# Patient Record
Sex: FEMALE | Race: WHITE | NOT HISPANIC OR LATINO
[De-identification: names, ages, dates, MRNs, and addresses within clinical notes are randomized per-mention and may not be internally consistent; named-entity substitution may affect disease eponyms.]

---

## 2017-01-19 ENCOUNTER — APPOINTMENT (OUTPATIENT)
Dept: HEART AND VASCULAR | Facility: CLINIC | Age: 77
End: 2017-01-19

## 2017-01-19 VITALS
SYSTOLIC BLOOD PRESSURE: 120 MMHG | WEIGHT: 147 LBS | DIASTOLIC BLOOD PRESSURE: 82 MMHG | RESPIRATION RATE: 18 BRPM | BODY MASS INDEX: 26.05 KG/M2 | HEIGHT: 63 IN | HEART RATE: 76 BPM

## 2017-01-19 RX ORDER — RASAGILINE MESYLATE 1 MG/1
1 TABLET ORAL
Refills: 0 | Status: ACTIVE | COMMUNITY

## 2017-02-02 ENCOUNTER — APPOINTMENT (OUTPATIENT)
Dept: HEART AND VASCULAR | Facility: CLINIC | Age: 77
End: 2017-02-02

## 2017-02-02 VITALS
SYSTOLIC BLOOD PRESSURE: 162 MMHG | HEIGHT: 63 IN | HEART RATE: 67 BPM | BODY MASS INDEX: 25.69 KG/M2 | WEIGHT: 145 LBS | DIASTOLIC BLOOD PRESSURE: 80 MMHG

## 2017-02-06 ENCOUNTER — RESULT REVIEW (OUTPATIENT)
Age: 77
End: 2017-02-06

## 2017-02-14 ENCOUNTER — APPOINTMENT (OUTPATIENT)
Dept: HEART AND VASCULAR | Facility: CLINIC | Age: 77
End: 2017-02-14

## 2017-02-14 ENCOUNTER — NON-APPOINTMENT (OUTPATIENT)
Age: 77
End: 2017-02-14

## 2017-02-14 VITALS
SYSTOLIC BLOOD PRESSURE: 160 MMHG | DIASTOLIC BLOOD PRESSURE: 84 MMHG | HEIGHT: 63 IN | BODY MASS INDEX: 25.87 KG/M2 | HEART RATE: 88 BPM | RESPIRATION RATE: 20 BRPM | WEIGHT: 146 LBS

## 2017-02-16 LAB
ANION GAP SERPL CALC-SCNC: 16 MMOL/L
BUN SERPL-MCNC: 13 MG/DL
CALCIUM SERPL-MCNC: 9.2 MG/DL
CHLORIDE SERPL-SCNC: 99 MMOL/L
CO2 SERPL-SCNC: 24 MMOL/L
CREAT SERPL-MCNC: 0.74 MG/DL
GLUCOSE SERPL-MCNC: 106 MG/DL
POTASSIUM SERPL-SCNC: 4.9 MMOL/L
SODIUM SERPL-SCNC: 139 MMOL/L

## 2017-02-23 ENCOUNTER — APPOINTMENT (OUTPATIENT)
Dept: HEART AND VASCULAR | Facility: CLINIC | Age: 77
End: 2017-02-23

## 2017-02-23 ENCOUNTER — LABORATORY RESULT (OUTPATIENT)
Age: 77
End: 2017-02-23

## 2017-02-23 VITALS
DIASTOLIC BLOOD PRESSURE: 70 MMHG | WEIGHT: 145 LBS | BODY MASS INDEX: 25.69 KG/M2 | SYSTOLIC BLOOD PRESSURE: 122 MMHG | HEART RATE: 72 BPM | HEIGHT: 63 IN | RESPIRATION RATE: 24 BRPM

## 2017-02-23 DIAGNOSIS — Z00.00 ENCOUNTER FOR GENERAL ADULT MEDICAL EXAMINATION W/OUT ABNORMAL FINDINGS: ICD-10-CM

## 2017-02-24 LAB
ALBUMIN SERPL ELPH-MCNC: 4.1 G/DL
ALP BLD-CCNC: 77 U/L
ALT SERPL-CCNC: 17 U/L
ANION GAP SERPL CALC-SCNC: 15 MMOL/L
APPEARANCE: CLEAR
AST SERPL-CCNC: 24 U/L
BASOPHILS # BLD AUTO: 0.04 K/UL
BASOPHILS NFR BLD AUTO: 1 %
BILIRUB SERPL-MCNC: 0.4 MG/DL
BILIRUBIN URINE: NEGATIVE
BLOOD URINE: NEGATIVE
BUN SERPL-MCNC: 12 MG/DL
CALCIUM SERPL-MCNC: 9.1 MG/DL
CHLORIDE SERPL-SCNC: 101 MMOL/L
CHOLEST SERPL-MCNC: 192 MG/DL
CHOLEST/HDLC SERPL: 3 RATIO
CO2 SERPL-SCNC: 22 MMOL/L
COLOR: YELLOW
CREAT SERPL-MCNC: 0.84 MG/DL
EOSINOPHIL # BLD AUTO: 0.06 K/UL
EOSINOPHIL NFR BLD AUTO: 1.6 %
GLUCOSE QUALITATIVE U: NORMAL MG/DL
GLUCOSE SERPL-MCNC: 90 MG/DL
HCT VFR BLD CALC: 40.1 %
HDLC SERPL-MCNC: 65 MG/DL
HGB BLD-MCNC: 12.7 G/DL
IMM GRANULOCYTES NFR BLD AUTO: 0.5 %
INR PPP: 1.03 RATIO
KETONES URINE: NEGATIVE
LDLC SERPL CALC-MCNC: 84 MG/DL
LEUKOCYTE ESTERASE URINE: ABNORMAL
LYMPHOCYTES # BLD AUTO: 1.18 K/UL
LYMPHOCYTES NFR BLD AUTO: 30.5 %
MAN DIFF?: NORMAL
MCHC RBC-ENTMCNC: 29.6 PG
MCHC RBC-ENTMCNC: 31.7 GM/DL
MCV RBC AUTO: 93.5 FL
MONOCYTES # BLD AUTO: 0.43 K/UL
MONOCYTES NFR BLD AUTO: 11.1 %
NEUTROPHILS # BLD AUTO: 2.14 K/UL
NEUTROPHILS NFR BLD AUTO: 55.3 %
NITRITE URINE: NEGATIVE
PH URINE: 6
PLATELET # BLD AUTO: 297 K/UL
POTASSIUM SERPL-SCNC: 4.6 MMOL/L
PROT SERPL-MCNC: 7.5 G/DL
PROTEIN URINE: NEGATIVE MG/DL
PT BLD: 11.6 SEC
RBC # BLD: 4.29 M/UL
RBC # FLD: 13.7 %
SODIUM SERPL-SCNC: 138 MMOL/L
SPECIFIC GRAVITY URINE: 1.02
TRIGL SERPL-MCNC: 214 MG/DL
UROBILINOGEN URINE: NORMAL MG/DL
WBC # FLD AUTO: 3.87 K/UL

## 2017-02-27 RX ORDER — CHLORHEXIDINE GLUCONATE 213 G/1000ML
1 SOLUTION TOPICAL ONCE
Qty: 0 | Refills: 0 | Status: DISCONTINUED | OUTPATIENT
Start: 2017-02-28 | End: 2017-02-28

## 2017-02-27 NOTE — H&P ADULT - NSHPLABSRESULTS_GEN_ALL_CORE
13.1   4.3   )-----------( 237      ( 28 Feb 2017 07:48 )             40.0     28 Feb 2017 07:48    138    |  103    |  13     ----------------------------<  94     4.3     |  27     |  0.74     Ca    8.8        28 Feb 2017 07:48    TPro  8.2    /  Alb  3.9    /  TBili  0.8    /  DBili  x      /  AST  21     /  ALT  9      /  AlkPhos  83     28 Feb 2017 07:48      PT/INR - ( 28 Feb 2017 07:48 )   PT: 11.5 sec;   INR: 1.04          PTT - ( 28 Feb 2017 07:48 )  PTT:30.6 sec 13.1   4.3   )-----------( 237      ( 28 Feb 2017 07:48 )             40.0     28 Feb 2017 07:48    138    |  103    |  13     ----------------------------<  94     4.3     |  27     |  0.74     Ca    8.8        28 Feb 2017 07:48    TPro  8.2    /  Alb  3.9    /  TBili  0.8    /  DBili  x      /  AST  21     /  ALT  9      /  AlkPhos  83     28 Feb 2017 07:48      PT/INR - ( 28 Feb 2017 07:48 )   PT: 11.5 sec;   INR: 1.04          PTT - ( 28 Feb 2017 07:48 )  PTT:30.6 sec    EKG: NSR @ 68bpm with no ischemic changes

## 2017-02-27 NOTE — H&P ADULT - HISTORY OF PRESENT ILLNESS
75 y/o F PMHx HTN, HLD, Hypothyroidism, Parkinson's Disease, who initially presented to her Cardiologist with c/o increase in palpitations and weakness with a "pulling" chest pain over the past couple months with associated GRIFFIN of 2 flight of stairs.  Symptoms are relieved with rest.  Denies any SOB at rest, PND, orthopnea, nausea, vomiting, diarrhea, LE edema.  Pt subsequently underwent a Stress Echo   Pt also had a CTA Coronaries on 2/17/17 revealing elevated Calcium score of 892, non-obstructive plaque in LM, probable moderate stenosis secondary to mixed calcific and non-calcific plaque in prox and mid LAD, boderline moderate stenosis in prox LCx, OM1 incompletely visualized but normal proximally, non-obstructive plaque in prox and mid RCA, enlarged descending thoracic aorta measuring approximately 3.8cm.       In light of patient's recent abnormal imaging, risk factors and CCS Class 3 Anginal Equivalent Symptoms, she now is referred for recommended Cardiac Catheterization with intervention if clinically indicated. 75 y/o F PMHx HTN, HLD, Hypothyroidism, Parkinson's Disease, who initially presented to her Cardiologist with c/o increase in palpitations and weakness with a "pulling" chest pain over the past couple months with associated GRIFFIN of 2 flight of stairs.  Symptoms are relieved with rest.  Denies any SOB at rest, PND, orthopnea, nausea, vomiting, diarrhea, LE edema.  Pt subsequently underwent a Exercise Stress Echo on 2/2/17 revealed exercise capacity to stage 3 Valentin Protocol to 5 METS terminated 2/2 fatigue, normal hemodynamic response, non-diagnostic ECG response with appropriate HR and BP response, mild segmental LV systolic dysfunction(EF 55-60% baseline), new WMA in lateral segments c/w ischemia. Pt also had a CTA Coronaries on 2/17/17 revealing elevated Calcium score of 892, non-obstructive plaque in LM, probable moderate stenosis secondary to mixed calcific and non-calcific plaque in prox and mid LAD, boderline moderate stenosis in prox LCx, OM1 incompletely visualized but normal proximally, non-obstructive plaque in prox and mid RCA, enlarged descending thoracic aorta measuring approximately 3.8cm.     In light of patient's recent abnormal imaging, risk factors and CCS Class 3 Anginal Equivalent Symptoms, she now is referred for recommended Cardiac Catheterization with intervention if clinically indicated.

## 2017-02-27 NOTE — H&P ADULT - ASSESSMENT
75 y/o F PMHx HTN, HLD, Hypothyroidism, Parkinson's Disease, who presents for recommended cardiac catheterization with possible intervention in the setting of class III anginal symptoms and abnormal NST and CTA coronaries.    ASA 3, Mallampati 2 75 y/o F PMHx HTN, HLD, Hypothyroidism, Parkinson's Disease, who presents for recommended cardiac catheterization with possible intervention in the setting of class III anginal symptoms and abnormal NST and CTA coronaries.    ASA 3, Mallampati 2    Risks & benefits of procedure and alternative therapy have been explained to the patient including but not limited to: allergic reaction, bleeding w/possible need for blood transfusion, infection, renal and vascular compromise, limb damage, arrhythmia, stroke, vessel dissection/perforation, Myocardial infarction, emergent CABG. Informed consent obtained and in chart.

## 2017-02-27 NOTE — H&P ADULT - NSHPPHYSICALEXAM_GEN_ALL_CORE
Appearance: Normal	  HEENT:   Normal oral mucosa, PERRL, EOMI	  Neck: Supple, - JVD; No Carotid Bruit   Cardiovascular: Normal S1 S2, No JVD, No murmurs  Respiratory: Lungs clear to auscultation/Decreased Breath Sounds/No Rales, Rhonchi, Wheezing	  Gastrointestinal:  Soft, Non-tender, + BS	  Skin: No rashes, No ecchymoses, No cyanosis  Extremities: Normal range of motion, No clubbing, cyanosis or edema  Vascular: Peripheral pulses palpable 2+ bilaterally  Neurologic: Non-focal  Psychiatry: A & O x 3, Mood & affect appropriate Appearance: Normal	  HEENT:   Normal oral mucosa, PERRL, EOMI	  Neck: Supple, - JVD; No Carotid Bruit   Cardiovascular: Normal S1 S2, No JVD, No murmurs  Respiratory: Lungs clear to auscultation, No Rales, Rhonchi, Wheezing	  Gastrointestinal:  Soft, Non-tender, + BS	  Skin: No rashes, No ecchymoses, No cyanosis  Extremities: Normal range of motion, No clubbing, cyanosis or edema  Vascular: Peripheral pulses palpable 2+ bilaterally, femoral pulses 2+ b/l without bruit, DP/PT 1+ b/l  Neurologic: Non-focal  Psychiatry: A & O x 3, Mood & affect appropriate

## 2017-02-28 ENCOUNTER — OUTPATIENT (OUTPATIENT)
Dept: OUTPATIENT SERVICES | Facility: HOSPITAL | Age: 77
LOS: 1 days | Discharge: NURSING, REHAB OR LONG-TERM CARE | End: 2017-02-28
Payer: MEDICARE

## 2017-02-28 LAB
ALBUMIN SERPL ELPH-MCNC: 3.9 G/DL — SIGNIFICANT CHANGE UP (ref 3.4–5)
ALP SERPL-CCNC: 83 U/L — SIGNIFICANT CHANGE UP (ref 40–120)
ALT FLD-CCNC: 9 U/L — LOW (ref 12–42)
ANION GAP SERPL CALC-SCNC: 8 MMOL/L — LOW (ref 9–16)
APTT BLD: 30.6 SEC — SIGNIFICANT CHANGE UP (ref 27.5–37.4)
AST SERPL-CCNC: 21 U/L — SIGNIFICANT CHANGE UP (ref 15–37)
BASOPHILS NFR BLD AUTO: 0.5 % — SIGNIFICANT CHANGE UP (ref 0–2)
BILIRUB SERPL-MCNC: 0.8 MG/DL — SIGNIFICANT CHANGE UP (ref 0.2–1.2)
BUN SERPL-MCNC: 13 MG/DL — SIGNIFICANT CHANGE UP (ref 7–23)
CALCIUM SERPL-MCNC: 8.8 MG/DL — SIGNIFICANT CHANGE UP (ref 8.5–10.5)
CHLORIDE SERPL-SCNC: 103 MMOL/L — SIGNIFICANT CHANGE UP (ref 96–108)
CHOLEST SERPL-MCNC: 190 MG/DL — SIGNIFICANT CHANGE UP
CK MB CFR SERPL CALC: <1 NG/ML — SIGNIFICANT CHANGE UP (ref 0.5–3.6)
CK SERPL-CCNC: 54 U/L — SIGNIFICANT CHANGE UP (ref 26–192)
CO2 SERPL-SCNC: 27 MMOL/L — SIGNIFICANT CHANGE UP (ref 22–31)
CREAT SERPL-MCNC: 0.74 MG/DL — SIGNIFICANT CHANGE UP (ref 0.5–1.3)
CRP SERPL-MCNC: <0.29 MG/DL — SIGNIFICANT CHANGE UP
EOSINOPHIL NFR BLD AUTO: 1.6 % — SIGNIFICANT CHANGE UP (ref 0–6)
GLUCOSE SERPL-MCNC: 94 MG/DL — SIGNIFICANT CHANGE UP (ref 70–99)
HBA1C BLD-MCNC: 5.6 % — SIGNIFICANT CHANGE UP (ref 4.8–5.6)
HCT VFR BLD CALC: 40 % — SIGNIFICANT CHANGE UP (ref 34.5–45)
HDLC SERPL-MCNC: 72 MG/DL — SIGNIFICANT CHANGE UP
HGB BLD-MCNC: 13.1 G/DL — SIGNIFICANT CHANGE UP (ref 11.5–15.5)
INR BLD: 1.04 — SIGNIFICANT CHANGE UP (ref 0.88–1.16)
LIPID PNL WITH DIRECT LDL SERPL: 98 MG/DL — SIGNIFICANT CHANGE UP
LYMPHOCYTES # BLD AUTO: 22.1 % — SIGNIFICANT CHANGE UP (ref 13–44)
MCHC RBC-ENTMCNC: 29.9 PG — SIGNIFICANT CHANGE UP (ref 27–34)
MCHC RBC-ENTMCNC: 32.8 G/DL — SIGNIFICANT CHANGE UP (ref 32–36)
MCV RBC AUTO: 91.3 FL — SIGNIFICANT CHANGE UP (ref 80–100)
MONOCYTES NFR BLD AUTO: 7.6 % — SIGNIFICANT CHANGE UP (ref 2–14)
NEUTROPHILS NFR BLD AUTO: 68.2 % — SIGNIFICANT CHANGE UP (ref 43–77)
PLATELET # BLD AUTO: 237 K/UL — SIGNIFICANT CHANGE UP (ref 150–400)
POTASSIUM SERPL-MCNC: 4.3 MMOL/L — SIGNIFICANT CHANGE UP (ref 3.5–5.3)
POTASSIUM SERPL-SCNC: 4.3 MMOL/L — SIGNIFICANT CHANGE UP (ref 3.5–5.3)
PROT SERPL-MCNC: 8.2 G/DL — SIGNIFICANT CHANGE UP (ref 6.4–8.2)
PROTHROM AB SERPL-ACNC: 11.5 SEC — SIGNIFICANT CHANGE UP (ref 10–13.1)
RBC # BLD: 4.38 M/UL — SIGNIFICANT CHANGE UP (ref 3.8–5.2)
RBC # FLD: 13 % — SIGNIFICANT CHANGE UP (ref 10.3–16.9)
SODIUM SERPL-SCNC: 138 MMOL/L — SIGNIFICANT CHANGE UP (ref 135–145)
TOTAL CHOLESTEROL/HDL RATIO MEASUREMENT: 2.6 RATIO — SIGNIFICANT CHANGE UP
TRIGL SERPL-MCNC: 102 MG/DL — SIGNIFICANT CHANGE UP
WBC # BLD: 4.3 K/UL — SIGNIFICANT CHANGE UP (ref 3.8–10.5)
WBC # FLD AUTO: 4.3 K/UL — SIGNIFICANT CHANGE UP (ref 3.8–10.5)

## 2017-02-28 PROCEDURE — C1769: CPT

## 2017-02-28 PROCEDURE — 93010 ELECTROCARDIOGRAM REPORT: CPT

## 2017-02-28 PROCEDURE — C1887: CPT

## 2017-02-28 PROCEDURE — C1889: CPT

## 2017-02-28 PROCEDURE — C1760: CPT

## 2017-02-28 PROCEDURE — 83036 HEMOGLOBIN GLYCOSYLATED A1C: CPT

## 2017-02-28 PROCEDURE — 82550 ASSAY OF CK (CPK): CPT

## 2017-02-28 PROCEDURE — 93458 L HRT ARTERY/VENTRICLE ANGIO: CPT

## 2017-02-28 PROCEDURE — 93458 L HRT ARTERY/VENTRICLE ANGIO: CPT | Mod: 26

## 2017-02-28 PROCEDURE — 85025 COMPLETE CBC W/AUTO DIFF WBC: CPT

## 2017-02-28 PROCEDURE — 93005 ELECTROCARDIOGRAM TRACING: CPT

## 2017-02-28 PROCEDURE — C1894: CPT

## 2017-02-28 PROCEDURE — 82553 CREATINE MB FRACTION: CPT

## 2017-02-28 PROCEDURE — 86140 C-REACTIVE PROTEIN: CPT

## 2017-02-28 PROCEDURE — 80053 COMPREHEN METABOLIC PANEL: CPT

## 2017-02-28 PROCEDURE — 85610 PROTHROMBIN TIME: CPT

## 2017-02-28 PROCEDURE — 85730 THROMBOPLASTIN TIME PARTIAL: CPT

## 2017-02-28 PROCEDURE — 80061 LIPID PANEL: CPT

## 2017-02-28 RX ORDER — CLOPIDOGREL BISULFATE 75 MG/1
600 TABLET, FILM COATED ORAL ONCE
Qty: 0 | Refills: 0 | Status: COMPLETED | OUTPATIENT
Start: 2017-02-28 | End: 2017-02-28

## 2017-02-28 RX ORDER — LOSARTAN POTASSIUM 100 MG/1
1 TABLET, FILM COATED ORAL
Qty: 0 | Refills: 0 | COMMUNITY

## 2017-02-28 RX ORDER — CHOLECALCIFEROL (VITAMIN D3) 125 MCG
1 CAPSULE ORAL
Qty: 0 | Refills: 0 | COMMUNITY

## 2017-02-28 RX ORDER — METOPROLOL TARTRATE 50 MG
1 TABLET ORAL
Qty: 0 | Refills: 0 | COMMUNITY

## 2017-02-28 RX ORDER — RASAGILINE 0.5 MG/1
1 TABLET ORAL
Qty: 0 | Refills: 0 | COMMUNITY

## 2017-02-28 RX ORDER — SODIUM CHLORIDE 9 MG/ML
1000 INJECTION INTRAMUSCULAR; INTRAVENOUS; SUBCUTANEOUS
Qty: 0 | Refills: 0 | Status: DISCONTINUED | OUTPATIENT
Start: 2017-02-28 | End: 2017-02-28

## 2017-02-28 RX ORDER — SIMVASTATIN 20 MG/1
1 TABLET, FILM COATED ORAL
Qty: 0 | Refills: 0 | COMMUNITY

## 2017-02-28 RX ORDER — RANOLAZINE 500 MG/1
1 TABLET, FILM COATED, EXTENDED RELEASE ORAL
Qty: 0 | Refills: 0 | COMMUNITY

## 2017-02-28 RX ORDER — CARBIDOPA AND LEVODOPA 25; 100 MG/1; MG/1
0.5 TABLET ORAL
Qty: 0 | Refills: 0 | COMMUNITY

## 2017-02-28 RX ORDER — LEVOTHYROXINE SODIUM 125 MCG
1 TABLET ORAL
Qty: 0 | Refills: 0 | COMMUNITY

## 2017-02-28 RX ORDER — ASPIRIN/CALCIUM CARB/MAGNESIUM 324 MG
325 TABLET ORAL ONCE
Qty: 0 | Refills: 0 | Status: COMPLETED | OUTPATIENT
Start: 2017-02-28 | End: 2017-02-28

## 2017-02-28 RX ADMIN — CLOPIDOGREL BISULFATE 600 MILLIGRAM(S): 75 TABLET, FILM COATED ORAL at 08:44

## 2017-02-28 RX ADMIN — SODIUM CHLORIDE 75 MILLILITER(S): 9 INJECTION INTRAMUSCULAR; INTRAVENOUS; SUBCUTANEOUS at 10:11

## 2017-02-28 RX ADMIN — Medication 325 MILLIGRAM(S): at 08:43

## 2017-02-28 NOTE — PROGRESS NOTE ADULT - SUBJECTIVE AND OBJECTIVE BOX
Interventional Cardiology PA SDA Discharge Note    Patient without complaints. Ambulated and voided without difficulties    Afebrile, VSS    Ext:    		Right/Left            R Groin:    no   hematoma,   no bruit, dressing; C/D/I  		Right/Left              Radial :    hematoma,     bleeding, dressing; C/D/I      Pulses:    intact RAD/DP/PT?to baseline     A/P:77 y/o F PMHx HTN, HLD, Hypothyroidism, Parkinson's Disease, who initially presented to her Cardiologist with c/o increase in palpitations and weakness with a "pulling" chest pain over the past couple months with associated GRIFFIN of 2 flight of stairs.  Symptoms are relieved with rest.  Denies any SOB at rest, PND, orthopnea, nausea, vomiting, diarrhea, LE edema.  Pt subsequently underwent a Exercise Stress Echo on 2/2/17 revealed exercise capacity to stage 3 Valentin Protocol to 5 METS terminated 2/2 fatigue, normal hemodynamic response, non-diagnostic ECG response with appropriate HR and BP response, mild segmental LV systolic dysfunction(EF 55-60% baseline), new WMA in lateral segments c/w ischemia. Pt also had a CTA Coronaries on 2/17/17 revealing elevated Calcium score of 892, non-obstructive plaque in LM, probable moderate stenosis secondary to mixed calcific and non-calcific plaque in prox and mid LAD, boderline moderate stenosis in prox LCx, OM1 incompletely visualized but normal proximally, non-obstructive plaque in prox and mid RCA, enlarged descending thoracic aorta measuring approximately 3.8cm.     In light of patient's recent abnormal imaging, risk factors and CCS Class 3 Anginal Equivalent Symptoms, she now is referred for recommended Cardiac Catheterization with intervention if clinically indicated.     Pt s/p cath 2/28 revealing nonobstructive CAD, EF 55%, LVEDP 3. R PC. Pt deemed stable for D/C home and instructed to f/u with Dr. Devante Fuentes.      1.	Stable for discharge as per attending Dr. Anderson after bed rest, pt voids, groin/wrist stable and 30 minutes of ambulation.  2.	Follow-up with PMD/Cardiologsapna Fuentes in 1-2 weeks  3.	Discharged forms signed and copies in chart Interventional Cardiology PA SDA Discharge Note    Patient without complaints. Ambulated and voided without difficulties    Afebrile, VSS    Ext:    		Right/Left            R Groin:    no   hematoma,   no bruit, dressing; C/D/I  		Right/Left              Radial :    hematoma,     bleeding, dressing; C/D/I      Pulses:    intact DP/PT to baseline     A/P:75 y/o F PMHx HTN, HLD, Hypothyroidism, Parkinson's Disease, who initially presented to her Cardiologist with c/o increase in palpitations and weakness with a "pulling" chest pain over the past couple months with associated GRIFFIN of 2 flight of stairs.  Symptoms are relieved with rest.  Denies any SOB at rest, PND, orthopnea, nausea, vomiting, diarrhea, LE edema.  Pt subsequently underwent a Exercise Stress Echo on 2/2/17 revealed exercise capacity to stage 3 Valentin Protocol to 5 METS terminated 2/2 fatigue, normal hemodynamic response, non-diagnostic ECG response with appropriate HR and BP response, mild segmental LV systolic dysfunction(EF 55-60% baseline), new WMA in lateral segments c/w ischemia. Pt also had a CTA Coronaries on 2/17/17 revealing elevated Calcium score of 892, non-obstructive plaque in LM, probable moderate stenosis secondary to mixed calcific and non-calcific plaque in prox and mid LAD, boderline moderate stenosis in prox LCx, OM1 incompletely visualized but normal proximally, non-obstructive plaque in prox and mid RCA, enlarged descending thoracic aorta measuring approximately 3.8cm.     In light of patient's recent abnormal imaging, risk factors and CCS Class 3 Anginal Equivalent Symptoms, she now is referred for recommended Cardiac Catheterization with intervention if clinically indicated.     Pt s/p cath 2/28 revealing nonobstructive CAD, EF 55%, LVEDP 3. R PC. Pt deemed stable for D/C home and instructed to f/u with Dr. Devante Fuentes.      1.	Stable for discharge as per attending Dr. Anderson after bed rest, pt voids, groin/wrist stable and 30 minutes of ambulation.  2.	Follow-up with PMD/Cardiologsapna Fuentes in 1-2 weeks  3.	Discharged forms signed and copies in chart

## 2017-02-28 NOTE — PROGRESS NOTE ADULT - SUBJECTIVE AND OBJECTIVE BOX
Procedure: LHC, Perclose  Indication: CAD, +EST  Complication: none    Result:  1) Non-obstructive CAD  2) Nl LVF EF 55%, LVEDP 3    Plan: D/C home. To f/u with Dr. Devante Fuentes.

## 2017-03-07 DIAGNOSIS — Z82.49 FAMILY HISTORY OF ISCHEMIC HEART DISEASE AND OTHER DISEASES OF THE CIRCULATORY SYSTEM: ICD-10-CM

## 2017-03-07 DIAGNOSIS — E78.5 HYPERLIPIDEMIA, UNSPECIFIED: ICD-10-CM

## 2017-03-07 DIAGNOSIS — I10 ESSENTIAL (PRIMARY) HYPERTENSION: ICD-10-CM

## 2017-03-07 DIAGNOSIS — I25.110 ATHEROSCLEROTIC HEART DISEASE OF NATIVE CORONARY ARTERY WITH UNSTABLE ANGINA PECTORIS: ICD-10-CM

## 2017-03-10 ENCOUNTER — RESULT REVIEW (OUTPATIENT)
Age: 77
End: 2017-03-10

## 2017-03-22 ENCOUNTER — APPOINTMENT (OUTPATIENT)
Dept: HEART AND VASCULAR | Facility: CLINIC | Age: 77
End: 2017-03-22

## 2017-03-22 VITALS
RESPIRATION RATE: 16 BRPM | BODY MASS INDEX: 25.52 KG/M2 | DIASTOLIC BLOOD PRESSURE: 70 MMHG | HEIGHT: 63 IN | HEART RATE: 72 BPM | WEIGHT: 144 LBS | SYSTOLIC BLOOD PRESSURE: 128 MMHG

## 2017-03-22 PROBLEM — G20 PARKINSON'S DISEASE: Chronic | Status: ACTIVE | Noted: 2017-02-27

## 2017-03-22 PROBLEM — E78.5 HYPERLIPIDEMIA, UNSPECIFIED: Chronic | Status: ACTIVE | Noted: 2017-02-27

## 2017-03-22 PROBLEM — I10 ESSENTIAL (PRIMARY) HYPERTENSION: Chronic | Status: ACTIVE | Noted: 2017-02-27

## 2017-03-22 RX ORDER — AZITHROMYCIN 250 MG/1
250 TABLET, FILM COATED ORAL
Qty: 6 | Refills: 0 | Status: DISCONTINUED | COMMUNITY
Start: 2016-11-21

## 2017-03-22 RX ORDER — OMEPRAZOLE 20 MG/1
20 CAPSULE, DELAYED RELEASE ORAL
Qty: 30 | Refills: 0 | Status: ACTIVE | COMMUNITY
Start: 2016-07-27

## 2017-03-22 RX ORDER — LEVOTHYROXINE SODIUM 0.05 MG/1
50 TABLET ORAL
Qty: 90 | Refills: 0 | Status: DISCONTINUED | COMMUNITY
Start: 2016-10-17

## 2017-03-22 RX ORDER — AMITRIPTYLINE HYDROCHLORIDE 25 MG/1
25 TABLET, FILM COATED ORAL
Qty: 30 | Refills: 0 | Status: ACTIVE | COMMUNITY
Start: 2016-09-01

## 2017-07-17 ENCOUNTER — APPOINTMENT (OUTPATIENT)
Dept: HEART AND VASCULAR | Facility: CLINIC | Age: 77
End: 2017-07-17

## 2017-07-17 VITALS
HEART RATE: 76 BPM | BODY MASS INDEX: 26.05 KG/M2 | WEIGHT: 147 LBS | SYSTOLIC BLOOD PRESSURE: 136 MMHG | HEIGHT: 63 IN | DIASTOLIC BLOOD PRESSURE: 72 MMHG | RESPIRATION RATE: 16 BRPM

## 2017-07-17 RX ORDER — PRAVASTATIN SODIUM 20 MG/1
20 TABLET ORAL
Qty: 90 | Refills: 3 | Status: ACTIVE | COMMUNITY

## 2017-07-17 RX ORDER — METOPROLOL SUCCINATE 25 MG/1
25 TABLET, EXTENDED RELEASE ORAL
Qty: 180 | Refills: 3 | Status: DISCONTINUED | COMMUNITY
Start: 2017-02-14 | End: 2017-07-17

## 2017-07-17 RX ORDER — LOSARTAN POTASSIUM 50 MG/1
50 TABLET, FILM COATED ORAL DAILY
Qty: 30 | Refills: 6 | Status: DISCONTINUED | COMMUNITY
End: 2017-07-17

## 2017-10-04 ENCOUNTER — NON-APPOINTMENT (OUTPATIENT)
Age: 77
End: 2017-10-04

## 2017-10-04 ENCOUNTER — APPOINTMENT (OUTPATIENT)
Age: 77
End: 2017-10-04
Payer: MEDICARE

## 2017-10-04 VITALS
HEIGHT: 63 IN | DIASTOLIC BLOOD PRESSURE: 78 MMHG | BODY MASS INDEX: 25.87 KG/M2 | WEIGHT: 146 LBS | RESPIRATION RATE: 18 BRPM | SYSTOLIC BLOOD PRESSURE: 122 MMHG | HEART RATE: 78 BPM

## 2017-10-04 DIAGNOSIS — I71.2 THORACIC AORTIC ANEURYSM, W/OUT RUPTURE: ICD-10-CM

## 2017-10-04 DIAGNOSIS — I10 ESSENTIAL (PRIMARY) HYPERTENSION: ICD-10-CM

## 2017-10-04 DIAGNOSIS — I51.9 HEART DISEASE, UNSPECIFIED: ICD-10-CM

## 2017-10-04 DIAGNOSIS — I34.0 NONRHEUMATIC MITRAL (VALVE) INSUFFICIENCY: ICD-10-CM

## 2017-10-04 DIAGNOSIS — I35.1 NONRHEUMATIC AORTIC (VALVE) INSUFFICIENCY: ICD-10-CM

## 2017-10-04 DIAGNOSIS — E78.5 HYPERLIPIDEMIA, UNSPECIFIED: ICD-10-CM

## 2017-10-04 DIAGNOSIS — I25.10 ATHEROSCLEROTIC HEART DISEASE OF NATIVE CORONARY ARTERY W/OUT ANGINA PECTORIS: ICD-10-CM

## 2017-10-04 DIAGNOSIS — R00.2 PALPITATIONS: ICD-10-CM

## 2017-10-04 PROCEDURE — 99214 OFFICE O/P EST MOD 30 MIN: CPT

## 2017-10-04 RX ORDER — METOPROLOL SUCCINATE 25 MG/1
25 TABLET, EXTENDED RELEASE ORAL DAILY
Qty: 90 | Refills: 3 | Status: DISCONTINUED | COMMUNITY
End: 2017-10-04

## 2017-10-04 RX ORDER — LOSARTAN POTASSIUM 50 MG/1
50 TABLET, FILM COATED ORAL
Refills: 0 | Status: ACTIVE | COMMUNITY

## 2017-10-16 ENCOUNTER — APPOINTMENT (OUTPATIENT)
Dept: HEART AND VASCULAR | Facility: CLINIC | Age: 77
End: 2017-10-16

## 2017-11-17 ENCOUNTER — NON-APPOINTMENT (OUTPATIENT)
Age: 77
End: 2017-11-17

## 2017-11-17 ENCOUNTER — APPOINTMENT (OUTPATIENT)
Dept: HEART AND VASCULAR | Facility: CLINIC | Age: 77
End: 2017-11-17
Payer: MEDICARE

## 2017-11-17 VITALS
RESPIRATION RATE: 16 BRPM | HEIGHT: 63 IN | HEART RATE: 76 BPM | SYSTOLIC BLOOD PRESSURE: 128 MMHG | DIASTOLIC BLOOD PRESSURE: 80 MMHG | BODY MASS INDEX: 26.22 KG/M2 | WEIGHT: 148 LBS

## 2017-11-17 PROCEDURE — 93228 REMOTE 30 DAY ECG REV/REPORT: CPT

## 2017-11-17 PROCEDURE — 99205 OFFICE O/P NEW HI 60 MIN: CPT

## 2017-11-29 RX ORDER — ATENOLOL 25 MG/1
25 TABLET ORAL DAILY
Qty: 30 | Refills: 6 | Status: ACTIVE | COMMUNITY
Start: 2017-11-29 | End: 1900-01-01

## 2017-11-30 ENCOUNTER — RX RENEWAL (OUTPATIENT)
Age: 77
End: 2017-11-30

## 2017-12-01 ENCOUNTER — RX RENEWAL (OUTPATIENT)
Age: 77
End: 2017-12-01

## 2018-01-19 ENCOUNTER — APPOINTMENT (OUTPATIENT)
Dept: HEART AND VASCULAR | Facility: CLINIC | Age: 78
End: 2018-01-19

## 2022-09-19 NOTE — H&P ADULT - PSH
"Single lumen 18g x 8 cm midline placed to left brachial vein. Max dwell date 10/18/22, Lot# [id number].  Needle advanced into the vessel under real time ultrasound guidance.  Image recorded and saved.     20g x 1 3/4" PIV placed to left forearm using usg  " No significant past surgical history